# Patient Record
Sex: MALE | Employment: FULL TIME | URBAN - METROPOLITAN AREA
[De-identification: names, ages, dates, MRNs, and addresses within clinical notes are randomized per-mention and may not be internally consistent; named-entity substitution may affect disease eponyms.]

---

## 2017-01-11 ENCOUNTER — TELEPHONE (OUTPATIENT)
Dept: SURGERY | Age: 32
End: 2017-01-11

## 2017-01-11 NOTE — TELEPHONE ENCOUNTER
Attempted to reach patient in regards to appointment on Jan 12th with . Unable to reach. Left message to remind patient to watch online seminar.

## 2017-01-12 ENCOUNTER — OFFICE VISIT (OUTPATIENT)
Dept: SURGERY | Age: 32
End: 2017-01-12

## 2017-01-12 VITALS
RESPIRATION RATE: 18 BRPM | BODY MASS INDEX: 36.45 KG/M2 | SYSTOLIC BLOOD PRESSURE: 108 MMHG | HEIGHT: 78 IN | TEMPERATURE: 97.5 F | DIASTOLIC BLOOD PRESSURE: 76 MMHG | WEIGHT: 315 LBS | HEART RATE: 88 BPM

## 2017-01-12 DIAGNOSIS — R06.83 SNORING: ICD-10-CM

## 2017-01-12 DIAGNOSIS — K50.10 CROHN'S DISEASE OF LARGE INTESTINE WITHOUT COMPLICATION (HCC): ICD-10-CM

## 2017-01-12 DIAGNOSIS — R53.82 CHRONIC FATIGUE: ICD-10-CM

## 2017-01-12 DIAGNOSIS — N32.1 COLOVESICAL FISTULA: ICD-10-CM

## 2017-01-12 DIAGNOSIS — K44.9 HIATAL HERNIA: ICD-10-CM

## 2017-01-12 DIAGNOSIS — E66.01 MORBID OBESITY WITH BMI OF 50.0-59.9, ADULT (HCC): Primary | ICD-10-CM

## 2017-01-12 DIAGNOSIS — E66.01 MORBID OBESITY WITH BMI OF 50.0-59.9, ADULT (HCC): ICD-10-CM

## 2017-01-12 DIAGNOSIS — M17.4 OTHER SECONDARY OSTEOARTHRITIS OF BOTH KNEES: ICD-10-CM

## 2017-01-12 PROBLEM — M17.0 OSTEOARTHRITIS OF BOTH KNEES: Status: ACTIVE | Noted: 2017-01-12

## 2017-01-12 RX ORDER — FEXOFENADINE HCL AND PSEUDOEPHEDRINE HCI 60; 120 MG/1; MG/1
1 TABLET, EXTENDED RELEASE ORAL EVERY 12 HOURS
COMMUNITY

## 2017-01-12 RX ORDER — ALBUTEROL SULFATE 90 UG/1
AEROSOL, METERED RESPIRATORY (INHALATION)
COMMUNITY

## 2017-01-12 NOTE — PROGRESS NOTES
Initial Consultation for Bariatric Surgery     Gisel Gomez is a 27 y.o. male who comes into the office today for initial consultation for the surgical options for the treatment of morbid obesity. He has tried a variety of weight-loss attempts but has yet to meet with lasting success. He has lost weight on various diet programs, but that weight always seems to return. Maximum weight lost on a diet is about 80 lbs, but that the weight always seems to return. Today, he is Height: 6' 7\" (200.7 cm) , Weight: (!) 202.8 kg (447 lb) for a BMI of Body mass index is 50.36 kg/(m^2). Rick Fernandez Maximum weight is 455. It is due to severe obesity, which is further complicated by comorbid conditions such as obstructive sleep apnea - clinical, weight related arthopathies and crohn's disease of colon that patient is now seeking out bariatric surgery, specifically, the sleeve gastrectomy. 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 Resection was in 2012 with pt asymptomatic since that time--he receives no treatment for Crohn's currently and has had no h/o upper GI involvement. Work up in 2009 revealed hiatal hernia on EGD--no GERD noted per pt. PCP is DANTE Ga, [place])      Weight Loss Metrics 1/12/2017 3/14/2012   Today's Wt 447 lb 380 lb   BMI 50.36 kg/m2 41.75 kg/m2       Body mass index is 50.36 kg/(m^2).         Past Medical History   Diagnosis Date    Asthma     Difficulty urinating     Dysuria     Enterovesical fistula     Esophageal reflux     Hiatus hernia syndrome     History of transfusion of whole blood     Loose, teeth     Sinus congestion 3/6/12    UTI (urinary tract infection)        Past Surgical History   Procedure Laterality Date    Endoscopy, colon, diagnostic      Cystoscopy  8/28/09    Hx bladder repair         Current Outpatient Prescriptions   Medication Sig Dispense Refill    fexofenadine-pseudoephedrine (ALLEGRA-D 12 HOUR)  mg Tb12 Take 1 Tab by mouth every twelve (12) hours.  albuterol (PROVENTIL HFA, VENTOLIN HFA, PROAIR HFA) 90 mcg/actuation inhaler Take  by inhalation. 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 Respiratory: Positive for cough. Snoring  witnessed apneas   Musculoskeletal: Positive for joint pain. All other systems reviewed and are negative. Physical Exam:  Visit Vitals    /76    Pulse 88    Temp 97.5 °F (36.4 °C) (Oral)    Resp 18    Ht 6' 7\" (2.007 m)    Wt (!) 202.8 kg (447 lb)    BMI 50.36 kg/m2       Physical Exam   Constitutional: He is oriented to person, place, and time and well-developed, well-nourished, and in no distress. HENT:   Head: Normocephalic and atraumatic. Mouth/Throat: Oropharynx is clear and moist.   Eyes: Conjunctivae are normal. Pupils are equal, round, and reactive to light. Neck: Normal range of motion. Neck supple. No thyromegaly present. Cardiovascular: Normal rate, regular rhythm and normal heart sounds. Exam reveals no gallop and no friction rub. No murmur heard. Pulmonary/Chest: Effort normal. No respiratory distress. He has wheezes. He has no rales. He exhibits no tenderness. Abdominal: Soft. He exhibits no distension and no mass. There is no tenderness. There is no rebound and no guarding.    Lower midline incision, well healed Musculoskeletal: Normal range of motion. He exhibits no edema, tenderness or deformity. Lymphadenopathy:     He has no cervical adenopathy. Neurological: He is alert and oriented to person, place, and time. Gait normal.   Skin: Skin is warm and dry. No rash noted. No erythema. Psychiatric: Mood, memory, affect and judgment normal.       Impression:    Luisito Angel is a 27 y.o. male who is suffering from morbid obesity with a BMI of Body mass index is 50.36 kg/(m^2). comorbidities as above who could benefit from bariatric surgery. He seems to be a reasonable candidate for sleeve gastrectomy if Crohn's disease stable and not present in stomach. We have discussed the risks, benefits and likely outcomes of sleeve gastrectomy. He has watched the online seminar and feels well informed. He's aware of the restrictive nature of the sleeve gastrectomy. The patient understands the likelihood of losing approximately 50 % of his excess weight in 12 to 18 months. The patient also understands the risks including but not limited to bleeding, infection, need for reoperation, leaks and strictures, bowel obstruction secondary to adhesions and internal hernias, DVT, PE, heart attack, stroke, and death. At this time we will enroll the patient in our bariatric program, undertake routine laboratory evaluation, chest X-ray, EKG, evaluation by nutritionist as well as psychologist.    Pending his satisfactory completion of the preop evaluation, we will plan to perform a sleeve gastrectomy with Dr. Fred Casarez if he is comfortable proceeding with surgery in this patient. He will have further education before a final decision about surgery including the pre-op teaching class and a pre-op visit with me. Total of 45 minutes of the 60minute visit was spent in counseling.     Of note, he has the following specific issues that we have discussed as part of his evaluation: UGI and EGD [facility] for Crohn's surveillance and , records from previous GI surgery [facility], sleep study    F/u 4-6 weeks Dr Loras Rinne to discuss findings of pre-op testing  Fiorella Andres PA-C

## 2017-01-12 NOTE — PROGRESS NOTES
Patient is a 27year old male presenting for a bariatric consult. Patient reports he watched online seminar and would like to further discuss his options before deciding on a procedure. Patient reports he has Crohns, hiatal hernia, and asthma. Body mass index is 50.36 kg/(m^2).

## 2017-01-12 NOTE — PATIENT INSTRUCTIONS
If you have any question or concerns about today's appointment, the verbal and/or written instructions you were given for follow up care, please call our office at [phone].      [facility], [address]  [place], [address]

## 2017-01-17 ENCOUNTER — TELEPHONE (OUTPATIENT)
Dept: SURGERY | Age: 32
End: 2017-01-17

## 2017-01-17 NOTE — TELEPHONE ENCOUNTER
M to make pt aware of [employer] [place] new wlt policy of 12 months. Also called Dr. Chaz Duran office and [facility] to get pt scheduled for EGD with him. Schedule pt for a sleep study consult with Dr. Lester Sosa [address] [place] [address] [phone] phone number.  Need to schedule pt to see Dr. Ebony Pepe in 3 months for a follow up

## 2017-02-01 ENCOUNTER — DOCUMENTATION ONLY (OUTPATIENT)
Dept: BARIATRICS/WEIGHT MGMT | Age: 32
End: 2017-02-01

## 2017-02-01 ENCOUNTER — HOSPITAL ENCOUNTER (OUTPATIENT)
Dept: BARIATRICS/WEIGHT MGMT | Age: 32
Discharge: HOME | End: 2017-02-01

## 2017-02-01 NOTE — PROGRESS NOTES
[address] [facility] [address]    Patient's Name: Christin Taylor   Age: 27 y.o. YOB: 1985   Sex: male    Date:   2/1/2017    Insurance:            Session: 1 of    Revision:   Surgeon:  Dr. Colby Cunningham    Height: 6 f  Weight:    440      Lbs. BMI: 57.7   Pounds Lost since last month: 7               Pounds Gained since last month: 0    Starting Weight: 447   Previous Months Weight: 447  Overall Pounds Lost: 7 Overall Pounds Gained: 0      Do you smoke? None    Alcohol intake:  Number of drinks at a time:  1  Number of times a week: 1    Class Guidelines    Patient understands that weight loss trial classes must be consecutive. Patient understands if they miss a class, it is their responsibility to contact me to reschedule class. Patient understands the expectations that weight maintenance/weight loss is expected during the classes. Failure to demonstrate changes may result in one extra month of weight loss trial, followed by going back to see the surgeon. Other Pertinent Information:       Eating Habits and Behaviors      Today we spent some time talking about the key diet principles. Patient understands the importance of fluid post-op. I have encouraged patient to start working towards 64 ounces of fluid per day. Fluids should be non-carbonated, no caffeine, and no calorie drinks. Patient was recommended to keep their daily carbohydrates less than 100 grams per day. Patient was given examples of carbohydrate-based food. I also provided patient with a log and have encouraged patient to track their daily carbohydrate intake. In class, we talked about appropriate snacks. Snacks should be protein-based. Patient was given a handout that had multiple types of fruits on it and indicated what the portion would be, which would be equal to 15-20 grams of carbohydrates based on that portion.   Patient was also given a handout for nuts and how many nuts are equal to 100 calories. Discussed with patient that nuts can be an appropriate snack; however, 14 almonds or 11 cashews are equal to 100 calories, so it is important to practice portion control. We also discussed appropriate protein drinks. Patient understands that will be part of their diet after surgery and is encouraged to start trying these supplements. Patient's current diet habits include: Patient is doing a lot of snacking between lunch and dinner. He states that he eats protein first.  He is up to 96 ounces of fluid and 24 ounces of diet soda. He is eating candy 1-2 x a day. Physical Activity/Exercise    Comments:  During class, I discussed with patient the importance of getting into an exercise routine. We talked about how strength training can help one's metabolism  Currently, patient is not doing anything for activity. Goals have been set. Behavior Modification       Comments:   Reinforced to patient some of the behavior modifications that need to be made in order to be successful long term. Patient needs to plan ahead. Lack on planning leads to poor food choices, skipping meals, increase in fast food. I have discussed with patient carrying a small cooler or lunch box with them to help keep healthy snacks on hand. Patient was also encouraged to eat meals at a table and avoid eating in front of the TV. Behavior Habit: Patient states that grazing is his eating behavior.   He is trying to make better choices on what to snack on.    Az Davies Gabo [address] RD  2/1/2017

## 2017-02-01 NOTE — PROGRESS NOTES
Nutrition Evaluation    Patient's Name: Chasity Cristina   Age: 27 y.o. YOB: 1985   Sex: male    Height: 6 f  Weight: 440 BMI:  57.7  Starting Weight:  447      Patient has completed a 1 month weight loss trial.  During the classes, we have focused on 3 components including diet, exercise, and behavior modifications. Upon completion of the weight loss trial, patient had a good understanding of the 3 components. The diet component of the weight loss trial emphasized on:   Label reading and keeping total fat and sugar less than 3 grams per serving    Proper portion sizes    Drinking 64 ounces of water per day   Cutting out simple sugar    The exercise component of the weight loss trial emphasized on:   The importance of getting into an exercise routine.  Ideas and goals for exercise were discussed with the patient. The behavior component of the weight loss trial emphasized on:   Taking 20-30 minutes to eat a meal.   Planning ahead to avoid making poor dietary choices.  Not eating in front of the TV or computer    Smoking Status:  NOne  Alcohol Intake:  Number of Drinks at a Time: 1-2  Number of Times a month: 1-2    Changes made during classes include:  Cut majority of carbohydrates  Started drinking protein shakes  Attempting to eat less out of emotion. Two things that patient learned during this weight loss trial:  I need to drink more water. Summary:  I feel that Chasity Cristina has demonstrated appropriate diet changes and is ready to move forward with surgery. Patient has been briefed on the importance of the protein drinks, vitamins, and the transition of the diet stages. Patient understands that the long-term diet will focus on protein and vegetables. Patient understand the effects of carbohydrates after surgery and what reactive hypoglycemia is.       Patient is aware that they will be attending pre-op class 2 weeks before surgery and will get more detailed information on the post-op diet guidelines. Patient will see me again at 6 weeks post-op.     Candidate for surgery: Yes  Re-evaluation Date:     Procedure: Gastric Sleeve    Ayush Hall, MS RD  2/1/2017

## 2017-02-14 ENCOUNTER — TELEPHONE (OUTPATIENT)
Dept: SURGERY | Age: 32
End: 2017-02-14

## 2017-02-14 NOTE — TELEPHONE ENCOUNTER
Spoke with pts wife very thouroughly about the weightloss trial form his insurance policy being 12 months. I spoke with her about obtaining office notes from a pcp concerning weight loss and a plan etc. Pts has an appointment on Friday that was orginially a possible pre op. Pts wife states she will call and have him reschedule it. Also wanted to get him in with Shefali Rich again for another WLT class. Wife states he will call back to schedule both appointments.

## 2017-02-15 NOTE — TELEPHONE ENCOUNTER
Spoke with pt he will see Robbie Morales and work on getting notes from his PCP dating a year back. [facility]

## 2017-02-24 ENCOUNTER — HOSPITAL ENCOUNTER (OUTPATIENT)
Dept: SLEEP MEDICINE | Age: 32
Discharge: HOME | End: 2017-02-24
Payer: COMMERCIAL

## 2017-02-24 ENCOUNTER — TELEPHONE (OUTPATIENT)
Dept: SLEEP MEDICINE | Age: 32
End: 2017-02-24

## 2017-02-24 DIAGNOSIS — G47.33 OSA (OBSTRUCTIVE SLEEP APNEA): ICD-10-CM

## 2017-02-24 DIAGNOSIS — E66.01 SEVERE OBESITY (BMI 35.0-35.9 WITH COMORBIDITY) (HCC): ICD-10-CM

## 2017-02-24 DIAGNOSIS — J40 BRONCHITIS: ICD-10-CM

## 2017-02-24 DIAGNOSIS — J45.909 ASTHMA: ICD-10-CM

## 2017-02-24 DIAGNOSIS — K50.90 CROHN DISEASE (HCC): ICD-10-CM

## 2017-02-24 PROCEDURE — 95810 POLYSOM 6/> YRS 4/> PARAM: CPT

## 2017-03-02 ENCOUNTER — HOSPITAL ENCOUNTER (OUTPATIENT)
Dept: BARIATRICS/WEIGHT MGMT | Age: 32
Discharge: HOME | End: 2017-03-02

## 2017-03-02 ENCOUNTER — DOCUMENTATION ONLY (OUTPATIENT)
Dept: BARIATRICS/WEIGHT MGMT | Age: 32
End: 2017-03-02

## 2017-03-02 NOTE — PROGRESS NOTES
[address] [facility] [address] [facility], [address]    Patient's Name: David Kumar   Age: 27 y.o. YOB: 1985   Sex: male    Date:   3/2/2017    Insurance:            Session: 2 of 12  Revision:   Surgeon:  Dr. Jay Worrell    Height: 6 f  Weight:    430      Lbs. BMI: 58.3   Pounds Lost since last month: 10               Pounds Gained since last month: 0    Starting Weight: 447   Previous Months Weight: 440  Overall Pounds Lost: 17 Overall Pounds Gained: 0      Do you smoke? None3    Alcohol intake:  Number of drinks at a time:  None  Number of times a week: Patrick    Class Guidelines    Patient understands that weight loss trial classes must be consecutive. Patient understands if they miss a class, it is their responsibility to contact me to reschedule class. Patient understands the expectations that weight maintenance/weight loss is expected during the classes. Failure to demonstrate changes may result in one extra month of weight loss trial, followed by going back to see the surgeon. Other Pertinent Information:     Changes Made Since Last Class: States he is drinking more fluid. He states that he formed a challenge with his friends of who can drink more water. He states he is drinking 140-160 ounces of fluid per day. Patient states he is trying to continue to cut back his carbohydrate intake. Eating Habits and Behaviors      Today we spent some time talking about the key diet principles. We spent some time reading labels. I have discussed with patient what carbohydrates are and how many carbohydrates are approximately in food. Patient understands that one serving of starch is ~ 15 grams grams of carbohydrates, one serving of fruit is ~ 15 grams of carbohydrates, and 1 serving of dairy is ~ 12 grams of carbohydrates.   Patient was made aware that protein, i.e., chicken, fish, lean beef, shellfish, yogurt, cottage cheese, lean pork, egg have little to no carbohydrates. Vegetables have approximately 5 grams of carbohydrates in 1 cup raw or 1/2 cup cooked. Discussed with patient that the average American eats between 400-500 grams of carbohydrates per day. Patient is encouraged to keep daily carbohydrates less than 100 grams during weight loss trial.    I also discussed protein-based breakfast choices and protein-based snacks. Patient was instructed to avoid cereal, bagels, breakfast sandwiches or other carbohydrates. I brought in 5 food items includin cup of cereal, 1 ounce bag of chips, a package of 4 peanut butter crackers, 1 packet of instant Weight Control Oatmeal, 3 Smarties candy, and a 24 ounces of Pepsi. Patient was asked to guess how many carbohydrates were in this portion. The idea was hopefully to illustrate how quickly carbohydrates add up. Patient's current diet habits include: 3 meals a day. Breakfast:  Scrambled eggs and sausage. B-L:  States he often times will not snack. Lunch:  Crock pot roasts, meats and vegetables. Leftovers. Lunch-dinner: States he will often grab whatever is laying around. States he may grab fruit. He states that sometimes he has chips if they are laying around. Dinner:  Meat and vegetables. Food-related goal:  Practicing portion control and using smaller size containers. Physical Activity/Exercise    Comments:  During class, I discussed with patient the importance of getting into an exercise routine. We talked about how strength training can help one's metabolism  Currently, patient is walking 2 x a week for activity. Goals have been set. Goal: Aim for 3-4 days a week of walking. Behavior Modification       Comments:   Reinforced to patient some of the behavior modifications that need to be made in order to be successful long term. Patient was also given a handout on Staying Motivated.   Patient was encouraged to identify their Triggering Circumstances and Reasons for Slips in Motivation. Responses ranged from disappointed on the scale, to going on vacation, to not planning ahead and then making poor choices, to falling off the wagon one day and not getting back on the next. Patient was asked to identify some of the accomplishments that have reached so far. This included dropping a few pounds since last month, to starting a walking routine, to stopping soda. We talked about ways to continue to keep motivation level high. Behavior Goal:  Patient states he is an emotional eater, so looking for an outlet instead of eating.     Brandi Saunders, Az Gabo [address] RD  3/2/2017

## 2017-04-04 ENCOUNTER — HOSPITAL ENCOUNTER (OUTPATIENT)
Dept: BARIATRICS/WEIGHT MGMT | Age: 32
Discharge: HOME | End: 2017-04-04

## 2017-04-05 ENCOUNTER — DOCUMENTATION ONLY (OUTPATIENT)
Dept: BARIATRICS/WEIGHT MGMT | Age: 32
End: 2017-04-05

## 2017-04-05 NOTE — PROGRESS NOTES
[address] [facility] [address]    Patient's Name: Sara Booth   Age: 27 y.o. YOB: 1985   Sex: male    Date:   4/5/2017    Insurance:            Session: 3 of  12  Revision:   Surgeon:  Dr. Jean Tapia    Height: 6 f  Weight:    440      Lbs. BMI: 59.8   Pounds Lost since last month: 0               Pounds Gained since last month: 10    Starting Weight: 447   Previous Months Weight: 430  Overall Pounds Lost: 7 Overall Pounds Gained: 0      Do you smoke? None    Alcohol intake:  Number of drinks at a time:  1  Number of times a week: 1    Class Guidelines    Patient understands that weight loss trial classes must be consecutive. Patient understands if they miss a class, it is their responsibility to contact me to reschedule class. Patient understands the expectations that weight maintenance/weight loss is expected during the classes. Failure to demonstrate changes may result in one extra month of weight loss trial, followed by going back to see the surgeon. Other Pertinent Information:     Changes made to diet since last month: Trying to drink more water      Eating Habits and Behaviors    During class, we focused on the normal key diet principles. We talked about the importance of not drinking liquid calories and aiming for 64 ounces of water per day. We also talked about cutting out carbohydrates. I discussed with patient that the average American consumes 400-500 grams of carbohydrates per day. Patient was instructed to cut out carbohydrates and aim for 100 grams or less. Today's lesson focused a lot on label reading. I first gave patient a power point on label reading. Throughout the power point, there were questions, such as how many calories are in 2 servings of a certain product. I then compared sugar free cookies to regular cookies for the patient.   The point of this activity was to demonstrate to them that sugar free cookies and other sugar free products do not mean calorie-free or carbohydrate-free and these foods should be avoid. We also looked at a box of whole wheat pasta and although it is whole wheat, it still has 42 grams of carbohydrates per 2 ounces. I have reinforced to patient to cut out breads, rice, pasta, cereal, and crackers. Patient's current diet habits include: 3 meals a day. Snacking on string cheese and trying to avoid sweeter snacks. Eating rice 1-2 x a week. Eating cake or ice cream 2-3 x a week, which I have addressed with him. He is drinking  ounces of water per day. He states he does a lot of cooking in the [employer] pot. Physical Activity/Exercise    Comments:  During class, I discussed with patient the importance of getting into an exercise routine. Patient was encouraged to purchase a pedometer to track steps. Patient is currently doing a 30 minute walk, 2 x a week for activity. Behavior Modification       Comments: In class, we focused on behavior principles. Many patients are not eating 3 meals a day. Some patients aren't eating 3 meals per day because of time, others are not eating it because they are not hungry. Talked with patient that breakfast stands for Break the Fast and it is essential that they get something on their system within 1 hour of waking up. Breakfast should focus on protein. This is also important to get the metabolism going. I have also talked to patient about limiting the places that they eat. All meals and snacks are encouraged to be consumed at a table. Patient feels that one of his biggest triggers is overeating and the inability to recognize cue such as hunger, satiety or fullness.       Az Duran Gabo [address] RD  4/5/2017

## 2017-05-18 ENCOUNTER — DOCUMENTATION ONLY (OUTPATIENT)
Dept: BARIATRICS/WEIGHT MGMT | Age: 32
End: 2017-05-18

## 2017-05-18 ENCOUNTER — HOSPITAL ENCOUNTER (OUTPATIENT)
Dept: BARIATRICS/WEIGHT MGMT | Age: 32
Discharge: HOME | End: 2017-05-18

## 2017-05-18 NOTE — PROGRESS NOTES
[address] [facility] [address]    Patient's Name: Tamera Harrison   Age: 27 y.o. YOB: 1985   Sex: male    Date:   5/18/2017    Insurance:            Session: 4 of 12  Revision:   Surgeon:  Dr. Sinai Funk    Height: 6 f  Weight:    442      Lbs. BMI: 60.1   Pounds Lost since last month: 0               Pounds Gained since last month: 2    Starting Weight: 447   Previous Months Weight: 440  Overall Pounds Lost: 5 Overall Pounds Gained:       Do you smoke? None    Alcohol intake:  Number of drinks at a time:  0  Number of times a week: 0    Class Guidelines    Patient understands that weight loss trial classes must be consecutive. Patient understands if they miss a class, it is their responsibility to contact me to reschedule class. Patient understands the expectations that weight maintenance/weight loss is expected during the classes. Failure to demonstrate changes may result in one extra month of weight loss trial, followed by going back to see the surgeon. Other Pertinent Information:     Changes Made Since Last Class: n/a    Eating Habits and Behaviors      Today we spent some time talking about the key diet principles. Patient was instructed to stop all liquid calories, including sweet tea, soda, fruit juices. We talked in class that 100 calories each day can amount to 10 pounds of weight gain in a year. In class, we also spent some time talking about carbohydrates. Patient was given examples of what equals 15 grams of carbohydrates including just a 1/3 of a cup of rice or pasta or a slice of bread. Patient was instructed to start cutting out bread, rice, pasta, cereal, and potatoes and emphasize on meat and vegetables only. Patient was also given a list of snack items that are protein-based.       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 Patient states he is doing a lot of stress eating. States he will grab whatever is laying around. This could be chips. This is something that is at work, not something that he is purchasing. I have suggested him bringing in healthier snacks, so he is prepared when he is in a stressful situation. Patient states breakfast is normally scrambled eggs and cheese and doesn't have toast often. Lunch:  Protein and vegetables. Sometimes they have dinner rolls. He states he eats more rice than pasta. I have recommended the cauliflower rice instead. Patient states he drinks a lot of fluid. 1 cup of 12-16 ounces of coffee in the morning. 100-120 ounces of water. 1-2: 12 ounce sodas per day. This is pretty much diet soda. I have encouraged NO liquid calories. States that they try not snacks after dinner. Goal: 1. Planning healthier snacks at work. 2. Decrease his portion sizes        Physical Activity/Exercise    Comments:  During class, I discussed with patient the importance of getting into an exercise routine. We talked about how strength training can help one's metabolism  Currently, patient is not doing much for activity. Goals have been set. I have encouraged patient to purchase a pedometer to track their steps. Goal is to go for a couple of walks per week. Behavior Modification       Comments: In class, I did a power point on Behavioral Changes and Weight Loss. This power point discussed that many of the habits we have were developed during childhood and that we get comfortable doing things, but in order to permanently get change on the scale, developing new habits will be very important. Some of the ways this can be achieved is by record keeping. This will help to observe eating habits and increase awareness of what, how much, and when they are eating.   Patient was also asked to identify their eating triggers whether it is social, emotional, situational, or physical.      We also talked about behavior strategies, such as taking 20 minutes to eat. Veto Shoulder just enough for the meal and don't put serving dishes on the table. Practice leaving food on the plate rather than feeling the need to eat everything. Restrict locations of where they will eat to just 1-2 locations. Methods of success include keeping weight loss up, weighing 1 x a week, keeping track of carbohydrates, limiting certain food, write down everything that is consumed, and planning in advance.      Az Ojeda Gabo [address] RD  5/18/2017

## 2017-06-15 ENCOUNTER — DOCUMENTATION ONLY (OUTPATIENT)
Dept: BARIATRICS/WEIGHT MGMT | Age: 32
End: 2017-06-15

## 2017-06-15 ENCOUNTER — HOSPITAL ENCOUNTER (OUTPATIENT)
Dept: BARIATRICS/WEIGHT MGMT | Age: 32
Discharge: HOME | End: 2017-06-15

## 2017-06-15 NOTE — PROGRESS NOTES
6/15/17: Patient did not show for his nutrition class. Patient did mention at his last appointment that he would be moving shortly a few hours away. Patient was left a voicemail to reschedule.     Sindi Miller MS RD

## 2017-06-15 NOTE — PROGRESS NOTES
[address] [place] [facility] [address]    Patient's Name: Sherrie Ortega   Age: 27 y.o. YOB: 1985   Sex: male    Date:   6/15/2017    Insurance:            Session: 5 of 12  Revision:   Surgeon:  Dr. Donnie Schwartz    Height: 6 f Weight:    443      Lbs. BMI: 60.2   Pounds Lost since last month: 1               Pounds Gained since last month: 0    Starting Weight: 447   Previous Months Weight: 442  Overall Pounds Lost: 4 Overall Pounds Gained: 0      Do you smoke? None    Alcohol intake:  Number of drinks at a time:  None  Number of times a week: None    Class Guidelines    Guidelines are reviewed with patient at the start of every class. 1. Patient understands that weight loss trial classes must be consecutive. Patient understands if they miss a class, it is their responsibility to contact me to reschedule class. I will reach out to patient after their first no show. 2.  Patient understands the expectations that weight maintenance/weight loss is expected during the classes. Failure to demonstrate changes may result in one extra month of weight loss trial, followed by going back to see the surgeon. 3. Patient is also instructed to be doing their labs, blood work, psych visit, support group and any other test that the surgeon has used while they are working on their weight loss trial.    Other Pertinent Information:     Changes Made Since Last Class: Patient states he is trying to drink less soda. Eating Habits and Behaviors      Today we spent some time talking about the key diet principles. Patient was given ideas of protein-based snacks including deli meat, low fat cheese, yogurt, hummus and vegetables, protein drink, or a small handful of nuts. Patient was instructed to start cutting out the empty carbohydrate-based snacks that include chips, cookies, pretzels, crackers.     We talked about carbohydrates and starting to count daily carbohydrate intake to keep less than 100 grams per day. Patient is encouraged to fill up on meat and vegetables only. Patient was given a goal of 64 ounces of fluid a day. This needs to be non-carbonated, no caffeine, and no sugary drinks. Patient's current diet habits include: Patient states he is down to 1 diet soda per day. He understands that is something he will need to completely get off of before surgery. Patient feels that he is struggling with his portions. Patient states as a kid he was always told to \"eat eat\" and is having trouble getting past that mindset. Breakfast:  Scrambled eggs. States he is trying to cut back on the toast.  Lunch:  Leftovers from the night before. Some dishes are pasta dishes, some are meat dishes. Patient states he is trying to do better about his snack choices. We had talked last night about trying to scale back his portions of snacks. I have encouraged him to start tracking his carbohydrates and aim for less than 100 grams per day. Snack choices such as beef jerky, string cheese. He states he doesn't have access to a fridge at work, so he doesn't bring string cheese or yogurt. Physical Activity/Exercise    Comments:  During class, I discussed with patient the importance of getting into an exercise routine. We talked about how strength training can help one's metabolism  Currently, patient is trying to walk more for activity. He is doing this 20-30 minutes, 2-3 x a week  Goals have been set. I have encouraged patient to purchase a pedometer to track their steps. Behavior Modification       Comments: In class, I did a power point on The 100-200 Mindless Margin. Studies show that the average person will not know if they ate 100 or 200 more or less calories than usual.  This is called the Mindless Margin.   In other words, one can eat up to 20% fewer calories and not activate the deprivation and craving mechanism in the brain. Patient was instructed to make a modest daily 100-200 calorie reduction in certain things that the body won't notice. One, 100-200 calorie change and you will lose 10-20 pounds in a year. We talked about strategies that may help including Food rules: I will not eat the whole dessert, but rather just 4 bites to satisfy me (pre-op). Patient was given a check off list with a month's worth of days across the top and was encouraged to come up with a food, exercise, and behavior goal.  Every evening if the goal was achieved, they get a check in the box. The goal is for it to be filled with check marks. One 100-calorie change that the patient is going to make is: Watching his portions and cutting it in half at dinner time.     Jesus Rosas [place] RD  6/15/2017

## 2017-07-20 ENCOUNTER — DOCUMENTATION ONLY (OUTPATIENT)
Dept: BARIATRICS/WEIGHT MGMT | Age: 32
End: 2017-07-20

## 2017-07-20 ENCOUNTER — HOSPITAL ENCOUNTER (OUTPATIENT)
Dept: BARIATRICS/WEIGHT MGMT | Age: 32
Discharge: HOME | End: 2017-07-20

## 2017-07-20 NOTE — PROGRESS NOTES
[address] [place] [facility] [address]    Patient's Name: Jorden Hagen   Age: 27 y.o. YOB: 1985   Sex: male    Date:   7/20/2017    Insurance:            Session: 6 of 6  Revision:   Surgeon:      Height: 6 f  Weight:    436      Lbs. BMI: 59.3   Pounds Lost since last month: 7               Pounds Gained since last month: 0    Starting Weight: 447   Previous Months Weight: 443  Overall Pounds Lost: 11 Overall Pounds Gained: 0      Do you smoke? NOne    Alcohol intake:  Number of drinks at a time:  None  Number of times a week: None    Class Guidelines    Guidelines are reviewed with patient at the start of every class. 1. Patient understands that weight loss trial classes must be consecutive. Patient understands if they miss a class, it is their responsibility to contact me to reschedule class. I will reach out to patient after their first no show. 2.  Patient understands the expectations that weight maintenance/weight loss is expected during the classes. Failure to demonstrate changes may result in one extra month of weight loss trial, followed by going back to see the surgeon. 3. Patient is also instructed to be doing their labs, blood work, psych visit, support group and any other test that the surgeon has used while they are working on their weight loss trial.    Other Pertinent Information:     Changes Made Since Last Class: Patient states they have moved and have been getting more produce and incorporating more vegetables and drinking more water. He states they have joined the local gym and doing some swimming there. Eating Habits and Behaviors      Today we reviewed key diet principles. Tips when eating out was discussed with patient including avoiding sandwiches to help reduce the carbohydrate intake, avoid drinking liquid calories, and try to split an entree.   We talked about limiting the frequency of eating out. We also talked about carbohydrates. Patient was encouraged to keep their carbohydrates under 100 grams while in weight loss trial classes and try to focus on meat and vegetables only. Patient was also encouraged to aim for 64 ounces of fluid per day without any liquid calories. Patient's current diet habits include: Patient states he is still eating 3 meals a day. Patient states he is trying to phase-out less carbohydrates. Patient states he is doing better with the carbohydrates and getting less overall. Breakfast:  Patient states he has a couple of eggs or may have some moeller. Lunch:  [place] or some leftovers. He states this is where more of his carbohydrates tend to come in. Dinner: More meat and vegetables, less carbohydrates. Patient states he periodically may do a soda, but is trying to drink more water. Patient is trying to do more protein-based snacks instead of Ian crackers. Physical Activity/Exercise    Comments:  During class, I gave a presentation called the Power of Walking. This presentation discussed all the benefits of walking, including decreasing the risk of developing diabetes by 30%. Patient was also made aware that losing 10 pounds of weight can relieve 40 pounds of pressure on the knees. Patient was encouraged to purchase a pedometer and use that as a tracker to increase their steps. The goals is ultimately 10,000 steps per day, but working towards that or using a pedometer to challenge themselves is a great self-motivator. Currently for exercise, patient is Patient states they are going to a gym 3 x a week. He is doing some free weights and swimming. Behavior Modification       Comments: In class, we also focused on some behavior modifications. These include not eating in front of the TV, which sometimes leads to mindless munching.   Shared with patients the study about people who were given 15 day old popcorn and despite the popcorn being 15days old, participants still ate it all because they were distracted by the movie and were not listening to physical cues with their body. I have also encouraged patient to start using a baby spoon or fork to eat their meals to help slow down the pace.       Hernandez Boyer, MS RD  7/20/2017

## 2017-07-20 NOTE — PROGRESS NOTES
7/20/17:  Patient came in for his initial consult in January. He needed a 12 month weight loss trial.  We were seeing each other in-person and some visits over the phone because patient moved about 4 hours away. We completed 6 visits, but since patient will be transferring his care closer to his new home and I am unable to meet with him in-person, future visits with me were not scheduled. Patient understands once he establishes care with a new surgeon, he can request my 6 month weight loss trial visits.     Dave Marcum MS RD